# Patient Record
Sex: FEMALE
[De-identification: names, ages, dates, MRNs, and addresses within clinical notes are randomized per-mention and may not be internally consistent; named-entity substitution may affect disease eponyms.]

---

## 2021-04-21 ENCOUNTER — NURSE TRIAGE (OUTPATIENT)
Dept: OTHER | Facility: CLINIC | Age: 76
End: 2021-04-21

## 2021-04-21 NOTE — TELEPHONE ENCOUNTER
activities, abdomen soft and not tender to touch     - MODERATE (4-7): interferes with normal activities or awakens from sleep, tender to touch     - SEVERE (8-10): excruciating pain, doubled over, unable to do any normal activities        Denies pain currently     7. ORAL INTAKE: If vomiting, \"Have you been able to drink liquids? \" \"How much fluids have you had in the past 24 hours? \"      N/a - no vomiting     8. HYDRATION: \"Any signs of dehydration? \" (e.g., dry mouth [not just dry lips], too weak to stand, dizziness, new weight loss) \"When did you last urinate? \"      Denies s/s of dehydration    9. EXPOSURE: \"Have you traveled to a foreign country recently? \" \"Have you been exposed to anyone with diarrhea? \" \"Could you have eaten any food that was spoiled? \"      Denies     10. ANTIBIOTIC USE: \"Are you taking antibiotics now or have you taken antibiotics in the past 2 months? \"        Denies     11. OTHER SYMPTOMS: \"Do you have any other symptoms? \" (e.g., fever, blood in stool)        Denies     12. PREGNANCY: \"Is there any chance you are pregnant? \" \"When was your last menstrual period? \"        N/a    Protocols used: [id number]